# Patient Record
Sex: FEMALE | Race: WHITE | URBAN - NONMETROPOLITAN AREA
[De-identification: names, ages, dates, MRNs, and addresses within clinical notes are randomized per-mention and may not be internally consistent; named-entity substitution may affect disease eponyms.]

---

## 2017-03-08 PROBLEM — H52.13: Noted: 2017-03-08

## 2019-04-23 ENCOUNTER — IMPORTED ENCOUNTER (OUTPATIENT)
Dept: URBAN - NONMETROPOLITAN AREA CLINIC 1 | Facility: CLINIC | Age: 24
End: 2019-04-23

## 2019-04-23 PROCEDURE — S0621 ROUTINE OPHTHALMOLOGICAL EXA: HCPCS

## 2019-04-23 NOTE — PATIENT DISCUSSION
Myopia-  Discussed diagnosis with patient. -  New GLRx given-  Explained that people who are myopic are at a higher risk for developing RD/RT and reviewed associated S&S.-  Discussed cl's fitting with patient-  Pt to contact our office if symptoms develop. -  Continue current spec Rx. Rx given.

## 2022-04-09 ASSESSMENT — VISUAL ACUITY
OS_SC: 20/20-
OD_SC: 20/20
OU_CC: 20/20

## 2022-04-09 ASSESSMENT — TONOMETRY
OS_IOP_MMHG: 14
OD_IOP_MMHG: 14